# Patient Record
Sex: FEMALE | Race: WHITE | NOT HISPANIC OR LATINO | Employment: FULL TIME | URBAN - METROPOLITAN AREA
[De-identification: names, ages, dates, MRNs, and addresses within clinical notes are randomized per-mention and may not be internally consistent; named-entity substitution may affect disease eponyms.]

---

## 2018-02-15 ENCOUNTER — TRANSCRIBE ORDERS (OUTPATIENT)
Dept: PHYSICAL THERAPY | Facility: CLINIC | Age: 38
End: 2018-02-15

## 2018-02-15 DIAGNOSIS — S93.601A SPRAIN OF RIGHT FOOT, INITIAL ENCOUNTER: Primary | ICD-10-CM

## 2018-02-16 ENCOUNTER — TREATMENT (OUTPATIENT)
Dept: PHYSICAL THERAPY | Facility: CLINIC | Age: 38
End: 2018-02-16

## 2018-02-16 DIAGNOSIS — S93.601D RIGHT FOOT SPRAIN, SUBSEQUENT ENCOUNTER: ICD-10-CM

## 2018-02-16 DIAGNOSIS — M79.671 RIGHT FOOT PAIN: Primary | ICD-10-CM

## 2018-02-16 PROCEDURE — 97014 ELECTRIC STIMULATION THERAPY: CPT | Performed by: PHYSICAL THERAPIST

## 2018-02-16 PROCEDURE — 97110 THERAPEUTIC EXERCISES: CPT | Performed by: PHYSICAL THERAPIST

## 2018-02-16 PROCEDURE — 97530 THERAPEUTIC ACTIVITIES: CPT | Performed by: PHYSICAL THERAPIST

## 2018-02-16 PROCEDURE — 97001 PR PHYS THERAPY EVALUATION: CPT | Performed by: PHYSICAL THERAPIST

## 2018-02-16 NOTE — PROGRESS NOTES
Physical Therapy Initial Evaluation and Plan of Care    TIME IN 1105 TIME OUT 1158  Patient: Margaret Perez   : 1980  Diagnosis/ICD-10 Code:  Right foot pain [M79.671]  Referring practitioner: Dharmesh Valencia MD    Subjective Evaluation    History of Present Illness  Date of onset: 2018  Mechanism of injury: Pt was kicking a soccer ball with a young client and and client stepped on patient's foot when trying to steal a ball.  Pt fell to the ground and heard a pop.  Pt came into PT clinic, ice applied and swelling and bruising appeared shortly after.  MD dx of Grade II ligament strain affecting 4th and 5th metatarsal.        Patient Occupation:  at [employer] and working full duty without running Pain  Current pain rating: 3  At best pain ratin  At worst pain ratin  Location: lateral right foot; Right ITB and SIJ discomfort  Quality: dull ache and sharp  Aggravating factors: ambulation and stairs (attempt to run)    Diagnostic Tests  X-ray: normal    Treatments  No previous or current treatments  Patient Goals  Patient goals for therapy: decreased pain, return to sport/leisure activities and return to work             Objective       Tenderness     Right Ankle/Foot   Tenderness in the fifth metatarsal base. No tenderness in the anterior talofibular ligament and lateral malleolus.     Active Range of Motion     Right Ankle/Foot   Dorsiflexion (ke): 15 degrees   Plantar flexion: 75 degrees   Inversion: 30 degrees   Eversion: 27 degrees     Joint Play     Right Ankle/Foot  Joints within functional limits are the talocrural joint, subtalar joint, midfoot and forefoot.     Strength/Myotome Testing     Right Hip   Planes of Motion   Extension: 5  Abduction: 4    Isolated Muscles   Gluteus maximums: 4    Right Ankle/Foot   Dorsiflexion: 5  Inversion: 5  Eversion: 4- (increased pain)    Tests     Lumbar     Right   Negative passive SLR.     Additional Tests  Details  Supine leg length (+) Left longer    Ambulation     Observational Gait   Decreased walking speed, right stance time and left step length.          Assessment & Plan     Assessment  Impairments: abnormal gait, impaired balance, impaired physical strength, lacks appropriate home exercise program and pain with function  Assessment details: Pt is active 37 y.o. Female who presents to physical therapy s/p right foot sprain following a [employer] client stepping on her foot.  Pt also presents with possible left anterior innominate rotation likely associated with altered gait mechanics.  Pt requires skilled physical therapy to address impairments and return to prior level of function including walking, stair navigation, running and weight lifting.    Prognosis: good  Functional Limitations: walking, uncomfortable because of pain, standing and stooping  Goals  Plan Goals: Short-term Goals - In 2 weeks:  1. Pt will be (I) with initial HEP.  2. Pt will perform SLS on right LE 30 seconds without loss of balance for improved ability to ambulate and navigate stairs without pain.  3. Pt will demonstrate ability to maintain neutral pelvis inter-session with (-) supine leg length and LISHA adduction drop test.    Long-term Goals - In 4 weeks:  1. Right hip flexion, abduction, extension strength improve to 5/5.  2. Pt will perform 6 inch step down WB on Right without pain, hip adduction or IR or contralateral pelvic drop.  3. Pt will ambulate with equal stance time, step length and adequate floor clearance.  4. LEFS score improve to 80/80 to demonstrate functional improvement.   5. Pt will participate in low level bilateral hops without increased pain or difficulty.    Plan  Therapy options: will be seen for skilled physical therapy services  Planned modality interventions: cryotherapy, electrical stimulation/Russian stimulation and thermotherapy (hydrocollator packs)  Planned therapy interventions: abdominal trunk  stabilization, balance/weight-bearing training, body mechanics training, functional ROM exercises, gait training, home exercise program, manual therapy, neuromuscular re-education, soft tissue mobilization, strengthening, stretching and therapeutic activities  Frequency: 3x week  Duration in weeks: 4  Treatment plan discussed with: patient        Manual Therapy:    10     mins  75362;  Therapeutic Exercise:    10     mins  15709;     Neuromuscular Clyde:    -    mins  81957;    Therapeutic Activity:     10     mins  86023;     Gait Training:      -     mins  37241;     Ultrasound:     -     mins  49938;    Electrical Stimulation:    15     mins  04301 ( );  Dry Needling     -     mins self-pay    Timed Treatment:   30   mins   Total Treatment:     53   mins    PT SIGNATURE: Marce Benavidez, PT, DPT   DATE TREATMENT INITIATED: 2/16/2018    Initial Certification  Certification Period: 5/17/2018  I certify that the therapy services are furnished while this patient is under my care.  The services outlined above are required by this patient, and will be reviewed every 90 days.     PHYSICIAN: Dharmesh Valencia MD      DATE:     Please sign and return via fax to [phone]. Thank you, [facility] Physical Therapy.

## 2018-02-16 NOTE — PATIENT INSTRUCTIONS
Purpose of treatment  Pathology and involved anatomy  Wear shorts to next session for initiation of soft tissue massage for LE  HEP performance  Please view [facility] Margaret Perez for a complete list of HEP instructions.

## 2018-02-19 ENCOUNTER — TREATMENT (OUTPATIENT)
Dept: PHYSICAL THERAPY | Facility: CLINIC | Age: 38
End: 2018-02-19

## 2018-02-19 DIAGNOSIS — M79.671 RIGHT FOOT PAIN: Primary | ICD-10-CM

## 2018-02-19 DIAGNOSIS — S93.601D RIGHT FOOT SPRAIN, SUBSEQUENT ENCOUNTER: ICD-10-CM

## 2018-02-19 PROCEDURE — 97110 THERAPEUTIC EXERCISES: CPT | Performed by: PHYSICAL THERAPIST

## 2018-02-19 PROCEDURE — 97530 THERAPEUTIC ACTIVITIES: CPT | Performed by: PHYSICAL THERAPIST

## 2018-02-19 PROCEDURE — 97014 ELECTRIC STIMULATION THERAPY: CPT | Performed by: PHYSICAL THERAPIST

## 2018-02-19 PROCEDURE — 97140 MANUAL THERAPY 1/> REGIONS: CPT | Performed by: PHYSICAL THERAPIST

## 2018-02-19 NOTE — PROGRESS NOTES
"Physical Therapy Daily Progress Note    Time In 1501  Time Out 1605    Margaret Perez reports: \"My foot is feeling better than it was.  I'm forcing myself to use it, walk more normally, and alternate feet going up and down steps.  It still hurts along the fourth and fifth metatarsals, especially on the outside, but it's getting better.  It felt good being taped.\"    Subjective     Objective   See Exercise, Manual, and Modality Logs for complete treatment.       Assessment & Plan     Assessment  Assessment details: Patient verbalizes gradual subjective improvements in (R) foot s/s and functional ability as well as positive response to kinesiotaping techniques.  She ambulates with mildly decreased (R) weightshift, stance time, and push-off (R) LE but demonstrates ability to negotiate steps reciprocally with mild compensation. Single limb stance activities are somewhat compensated (R) LE versus (L) LE and tenderness persists primarily along superior and lateral aspects of fifth metatarsal.        Progress per Plan of Care         Manual Therapy:    9     mins  34364;  Therapeutic Exercise:    21     mins  77555;     Neuromuscular Clyde:        mins  14200;    Therapeutic Activity:     16     mins  32578;     Gait Training:           mins  71436;     Ultrasound:          mins  98619;    Electrical Stimulation:    15     mins  43691 ( );  Dry Needling          mins self-pay    Timed Treatment:   46   mins   Total Treatment:     64   mins    Clover Brand PT, DPT  Physical Therapist  [place] License # [id number]    "

## 2018-02-20 ENCOUNTER — TRANSCRIBE ORDERS (OUTPATIENT)
Dept: PHYSICAL THERAPY | Facility: CLINIC | Age: 38
End: 2018-02-20

## 2018-02-20 ENCOUNTER — TREATMENT (OUTPATIENT)
Dept: PHYSICAL THERAPY | Facility: CLINIC | Age: 38
End: 2018-02-20

## 2018-02-20 DIAGNOSIS — S93.601D RIGHT FOOT SPRAIN, SUBSEQUENT ENCOUNTER: ICD-10-CM

## 2018-02-20 DIAGNOSIS — M79.671 RIGHT FOOT PAIN: Primary | ICD-10-CM

## 2018-02-20 PROCEDURE — 97140 MANUAL THERAPY 1/> REGIONS: CPT | Performed by: PHYSICAL THERAPIST

## 2018-02-20 PROCEDURE — 97035 APP MDLTY 1+ULTRASOUND EA 15: CPT | Performed by: PHYSICAL THERAPIST

## 2018-02-20 NOTE — PROGRESS NOTES
Physical Therapy Daily Progress Note    Time In 1357  Time Out 1425    Margaret Perez reports: doing well and no pain in foot pre-treatment. Pt reports most pain when sitting for a long time and having to get up.  Pt reports compliance with HEP.  Pt reports MD for ultrasound instead of electrical stimulation.  Pt reports MD concern for stress fracture, but no follow up scheduled, unless pt is still experiencing pain on Monday.    Subjective     Objective   See Exercise, Manual, and Modality Logs for complete treatment.       Assessment & Plan     Assessment  Assessment details: Pt demonstrates good understanding and compliance with HEP.  Electrical stimulation deferred today due to no pain pre-treatment or post-treatment.  Initiated ultrasound per MD order today.  Pt will continue to progress towards goals with continued skilled PT and regular HEP performance.        Progress strengthening /stabilization /functional activity           Manual Therapy:    15     mins  25689;  Therapeutic Exercise:    -     mins  57724;     Neuromuscular Clyde:    -    mins  46906;    Therapeutic Activity:     -     mins  60402;     Gait Training:      -     mins  82562;     Ultrasound:     8     mins  69641;    Electrical Stimulation:    -     mins  82424 ( );  Dry Needling     -     mins self-pay    Timed Treatment:   15   mins   Total Treatment:     28   mins    Marce Benavidez, PT, DPT  Physical Therapist

## 2018-02-22 ENCOUNTER — TREATMENT (OUTPATIENT)
Dept: PHYSICAL THERAPY | Facility: CLINIC | Age: 38
End: 2018-02-22

## 2018-02-22 DIAGNOSIS — M79.671 RIGHT FOOT PAIN: Primary | ICD-10-CM

## 2018-02-22 DIAGNOSIS — S93.601D RIGHT FOOT SPRAIN, SUBSEQUENT ENCOUNTER: ICD-10-CM

## 2018-02-22 PROCEDURE — 97140 MANUAL THERAPY 1/> REGIONS: CPT | Performed by: PHYSICAL THERAPIST

## 2018-02-22 PROCEDURE — 97014 ELECTRIC STIMULATION THERAPY: CPT | Performed by: PHYSICAL THERAPIST

## 2018-02-22 NOTE — PROGRESS NOTES
Physical Therapy Daily Progress Note    Time In 1105  Time Out 1135    Margaret Perez reports: increased soreness from standing a lot yesterday for work.    Subjective     Objective       Tenderness     Right Ankle/Foot   No tenderness in the fifth metatarsal base.     Additional Tenderness Details  Right shaft of 4th and 5th metatarsals (-) TTP    Joint Play     Right Ankle/Foot  Joints within functional limits are the forefoot.     Additional Joint Play Details  Right 4th and 5th metatarsal A-P and P-A hypermobile     See Exercise, Manual, and Modality Logs for complete treatment.       Assessment & Plan     Assessment  Assessment details: Pt demonstrates improved symptoms and (-) TTP at base of 5th metatarsal or shaft of 4th and 5th metatarsals.  Treatment session focused on decreasing pain and improving mobility.  Pt continues to demonstrate excellent progress towards goals and excellent compliance with HEP.  Ultrasound deferred due to pt request.        Progress per Plan of Care and towards d/c to (I) with HEP over next week.           Manual Therapy:    15     mins  42056;  Therapeutic Exercise:    -     mins  79823;     Neuromuscular Clyde:    -    mins  92654;    Therapeutic Activity:     -     mins  03484;     Gait Training:      -     mins  61343;     Ultrasound:     -     mins  60702;    Electrical Stimulation:    15     mins  81678 ( );  Dry Needling     -     mins self-pay    Timed Treatment:   15   mins   Total Treatment:     30   mins    Marce Benavidez, PT, DPT  Physical Therapist

## 2018-02-26 ENCOUNTER — TREATMENT (OUTPATIENT)
Dept: PHYSICAL THERAPY | Facility: CLINIC | Age: 38
End: 2018-02-26

## 2018-02-26 DIAGNOSIS — S93.601D RIGHT FOOT SPRAIN, SUBSEQUENT ENCOUNTER: ICD-10-CM

## 2018-02-26 DIAGNOSIS — M79.671 RIGHT FOOT PAIN: Primary | ICD-10-CM

## 2018-02-26 PROCEDURE — 97014 ELECTRIC STIMULATION THERAPY: CPT | Performed by: PHYSICAL THERAPIST

## 2018-02-26 PROCEDURE — 97140 MANUAL THERAPY 1/> REGIONS: CPT | Performed by: PHYSICAL THERAPIST

## 2018-02-26 NOTE — PROGRESS NOTES
Physical Therapy Daily Progress Note    Time In 1104  Time Out 1133    Margaret Perez reports: foot is sore, but eager to attempt to run today.  Pt reports push-off increases pain..     Subjective     Objective   See Exercise, Manual, and Modality Logs for complete treatment.       Assessment & Plan     Assessment  Assessment details: Omitted Kinesiotape webbing due to improvement in swelling and reported improved symptoms from the support of Kinesiotape.  Pt performed bilateral and single-leg hops with right shoe on.  Pt demonstrated adequate floor clearance and no pain.  Pt educated on jogging on predictable surface for trial and limiting duration to avoid increased stress and pain.  Pt is progressing well and performs HEP independently at this time.      Plan  Plan details: Progress to (I) with HEP pending tolerance to higher level activity including jogging          Progress per Plan of Care   Assess tolerance to jogging           Manual Therapy:    9     mins  41155;  Therapeutic Exercise:    5     mins  48205;     Neuromuscular Clyde:    -    mins  56809;    Therapeutic Activity:     -     mins  31177;     Gait Training:      -     mins  87917;     Ultrasound:     -     mins  89594;    Electrical Stimulation:    15     mins  13991 ( );  Dry Needling     -     mins self-pay    Timed Treatment:   14   mins   Total Treatment:     29   mins    Marce Benavidez, PT, DPT  Physical Therapist

## 2018-02-28 ENCOUNTER — TREATMENT (OUTPATIENT)
Dept: PHYSICAL THERAPY | Facility: CLINIC | Age: 38
End: 2018-02-28

## 2018-02-28 DIAGNOSIS — S93.601D RIGHT FOOT SPRAIN, SUBSEQUENT ENCOUNTER: ICD-10-CM

## 2018-02-28 DIAGNOSIS — M79.671 RIGHT FOOT PAIN: Primary | ICD-10-CM

## 2018-02-28 PROCEDURE — 97140 MANUAL THERAPY 1/> REGIONS: CPT | Performed by: PHYSICAL THERAPIST

## 2018-02-28 PROCEDURE — 97014 ELECTRIC STIMULATION THERAPY: CPT | Performed by: PHYSICAL THERAPIST

## 2018-02-28 NOTE — PROGRESS NOTES
Physical Therapy Daily Progress Note    Time In 1306  Time Out 1339    Margaret Perez reports: doing well, hasn't attempted jogging yet.  Pt reports stiffness and soreness in foot first thing in AM.    Subjective     Objective       Joint Play     Additional Joint Play Details  Right 5th metatarsal A<-> P mobilization hypermobile and pain-free       See Exercise, Manual, and Modality Logs for complete treatment.       Assessment & Plan     Assessment  Assessment details: Pt educated on importance of towel curls for strengthening intrinsics of foot and for continued single-leg activities to improve ankle strength and stability due to hypermobility of lateral foot.  Kinesiotape continued due to decreased pain with application.          Progress per Plan of Care           Manual Therapy:    10     mins  63187;  Therapeutic Exercise:    -     mins  43156;     Neuromuscular Clyde:    -    mins  36745;    Therapeutic Activity:     -     mins  71516;     Gait Training:      -     mins  93045;     Ultrasound:     -     mins  15906;    Electrical Stimulation:    15     mins  83794 ( );  Dry Needling     -     mins self-pay  Self care home mangmt 5 mins 46425    Timed Treatment:   15   mins   Total Treatment:     33   mins    Marce Benavidez, PT, DPT  Physical Therapist

## 2018-03-02 ENCOUNTER — TREATMENT (OUTPATIENT)
Dept: PHYSICAL THERAPY | Facility: CLINIC | Age: 38
End: 2018-03-02

## 2018-03-02 DIAGNOSIS — S93.601D RIGHT FOOT SPRAIN, SUBSEQUENT ENCOUNTER: ICD-10-CM

## 2018-03-02 DIAGNOSIS — M79.671 RIGHT FOOT PAIN: Primary | ICD-10-CM

## 2018-03-02 PROCEDURE — 97110 THERAPEUTIC EXERCISES: CPT | Performed by: PHYSICAL THERAPIST

## 2018-03-02 PROCEDURE — 97014 ELECTRIC STIMULATION THERAPY: CPT | Performed by: PHYSICAL THERAPIST

## 2018-03-02 NOTE — PROGRESS NOTES
"Physical Therapy Daily Progress Note    Time In 1131  Time Out 1157    Margaret Perez reports: hasn't run yet.  Right ankle is now sore and bone on foot doesn't feel as bad.      Subjective     Objective       Ambulation     Observational Gait   Left stance time, right stance time, left step length and right step length within functional limits.     Functional Assessment     Forward Step Down 6\"     Right Leg  Valgus. No pain and negative Trendelenburg.      See Exercise, Manual, and Modality Logs for complete treatment.       Assessment & Plan     Assessment  Assessment details: Pt demonstrates improved gait mechanics and tolerance to weight bearing.  Pt continues to demonstrate decreased neuromuscular control on Right LE.  Pt control and mechanics with step down improved with verbal cueing and attention to task.  Pt has not yet returned to prior level of function.  Pt is progressing well towards goals and continues to require skilled physical therapy to progress strength and function to prior status including running.        Progress per Plan of Care           Manual Therapy:    5     mins  05374;  Therapeutic Exercise:    8     mins  90525;     Neuromuscular Clyde:    -    mins  09924;    Therapeutic Activity:     -     mins  01808;     Gait Training:      -     mins  89989;     Ultrasound:     -     mins  31983;    Electrical Stimulation:    12     mins  29171 ( );  Dry Needling     -     mins self-pay    Timed Treatment:   13   mins   Total Treatment:     26   mins    Marce Benavidez, PT, DPT  Physical Therapist    "

## 2018-03-05 ENCOUNTER — TREATMENT (OUTPATIENT)
Dept: PHYSICAL THERAPY | Facility: CLINIC | Age: 38
End: 2018-03-05

## 2018-03-05 DIAGNOSIS — M79.671 RIGHT FOOT PAIN: Primary | ICD-10-CM

## 2018-03-05 DIAGNOSIS — S93.601D RIGHT FOOT SPRAIN, SUBSEQUENT ENCOUNTER: ICD-10-CM

## 2018-03-05 PROCEDURE — 97014 ELECTRIC STIMULATION THERAPY: CPT | Performed by: PHYSICAL THERAPIST

## 2018-03-05 PROCEDURE — 97140 MANUAL THERAPY 1/> REGIONS: CPT | Performed by: PHYSICAL THERAPIST

## 2018-03-05 NOTE — PROGRESS NOTES
Physical Therapy Daily Progress Note    Time In 1053  Time Out 1130    Margaret Perez reports: some soreness in whole body and also in right foot after jogging on turf yesterday.  Pt reports some swelling in foot and turns were the hardest.     Subjective     Objective   See Exercise, Manual, and Modality Logs for complete treatment.       Assessment & Plan     Assessment  Assessment details: Pt presented with mild increased swelling after jogging on the turf yesterday.  Pt encouraged on regular towel curls for strengthening intrinsics and medial arch.  Pt educated on gradual progression of activity, tissue healing time and to take turns wide.  Pt educated on less stress of straight-plane running and to avoid cutting, planning and pivoting on Right LE.  Re-initiated webbing kinesiotape to improve circulation and decrease swelling.         Progress per Plan of Care           Manual Therapy:    15     mins  28016;  Therapeutic Exercise:    -     mins  49302;     Neuromuscular Clyde:    -    mins  54363;    Therapeutic Activity:     -     mins  09718;     Gait Training:      -     mins  92762;     Ultrasound:     -     mins  44564;    Electrical Stimulation:    15     mins  32526 ( );  Dry Needling     -     mins self-pay    Timed Treatment:   15   mins   Total Treatment:     37   mins    Marce Benavidez, PT, DPT  Physical Therapist

## 2018-03-07 ENCOUNTER — TREATMENT (OUTPATIENT)
Dept: PHYSICAL THERAPY | Facility: CLINIC | Age: 38
End: 2018-03-07

## 2018-03-07 DIAGNOSIS — M79.671 RIGHT FOOT PAIN: Primary | ICD-10-CM

## 2018-03-07 DIAGNOSIS — S93.601D RIGHT FOOT SPRAIN, SUBSEQUENT ENCOUNTER: ICD-10-CM

## 2018-03-07 PROCEDURE — 97140 MANUAL THERAPY 1/> REGIONS: CPT | Performed by: PHYSICAL THERAPIST

## 2018-03-07 PROCEDURE — 97014 ELECTRIC STIMULATION THERAPY: CPT | Performed by: PHYSICAL THERAPIST

## 2018-03-07 NOTE — PROGRESS NOTES
Physical Therapy Daily Progress Note    Time In 1105  Time Out 1120  Pt returned at 1445 for modalities   And time out 1510    Margaret Perez reports: improvement in pain with Kinesiotape on.  Pt reports plan to workout today but has not run again.     Subjective     Objective   See Exercise, Manual, and Modality Logs for complete treatment.       Assessment & Plan     Assessment  Assessment details: Pt demonstrates improvement in lateral foot swelling but has not attempted to run again since prior to last session.  Pt is progressing well under current treatment plan and continued PT will be pending response to return to exercise activity and running due to pt occupation requiring occasional running and exercise demonstration.      Progress per Plan of Care           Manual Therapy:    15     mins  87665;  Therapeutic Exercise:    -     mins  59978;     Neuromuscular Clyde:    -    mins  41051;    Therapeutic Activity:     -     mins  58019;     Gait Training:      -     mins  63770;     Ultrasound:     -     mins  81883;    Electrical Stimulation:    15     mins  16461 ( );  Dry Needling     -     mins self-pay    Timed Treatment:   15   mins   Total Treatment:     40   mins    Marce Benavidez, PT, DPT  Physical Therapist

## 2018-03-09 ENCOUNTER — TREATMENT (OUTPATIENT)
Dept: PHYSICAL THERAPY | Facility: CLINIC | Age: 38
End: 2018-03-09

## 2018-03-09 DIAGNOSIS — S93.601D RIGHT FOOT SPRAIN, SUBSEQUENT ENCOUNTER: ICD-10-CM

## 2018-03-09 DIAGNOSIS — M79.671 RIGHT FOOT PAIN: Primary | ICD-10-CM

## 2018-03-09 PROCEDURE — 97014 ELECTRIC STIMULATION THERAPY: CPT | Performed by: PHYSICAL THERAPIST

## 2018-03-09 PROCEDURE — 97140 MANUAL THERAPY 1/> REGIONS: CPT | Performed by: PHYSICAL THERAPIST

## 2018-03-10 NOTE — PROGRESS NOTES
Physical Therapy Daily Progress Note    Time In 1030  Time Out 1100    Margaret Perez reports: foot has been sore lately.    Subjective     Objective   See Exercise, Manual, and Modality Logs for complete treatment.       Assessment & Plan     Assessment  Assessment details: Pt soreness is related to degree of sprain and pt work responsibilities requiring extended time standing and walking.  Pt continues to respond well to Kinesiotape and modalities to decrease pain.          Progress per Plan of Care  - progress to more active strategies and to d/c to (I) with HEP.         Manual Therapy:    10     mins  06407;  Therapeutic Exercise:    -     mins  84792;     Neuromuscular Clyde:    -    mins  18394;    Therapeutic Activity:     -     mins  83346;     Gait Training:      -     mins  30196;     Ultrasound:     -     mins  94733;    Electrical Stimulation:    15     mins  93505 ( );  Dry Needling     -     mins self-pay    Timed Treatment:   10   mins   Total Treatment:     30   mins    Marce Benavidez, PT, DPT  Physical Therapist

## 2024-10-03 ENCOUNTER — HOSPITAL ENCOUNTER (EMERGENCY)
Facility: HOSPITAL | Age: 44
Discharge: HOME | End: 2024-10-03
Attending: STUDENT IN AN ORGANIZED HEALTH CARE EDUCATION/TRAINING PROGRAM
Payer: COMMERCIAL

## 2024-10-03 VITALS
SYSTOLIC BLOOD PRESSURE: 123 MMHG | BODY MASS INDEX: 21.34 KG/M2 | OXYGEN SATURATION: 98 % | HEART RATE: 54 BPM | DIASTOLIC BLOOD PRESSURE: 85 MMHG | HEIGHT: 64 IN | RESPIRATION RATE: 16 BRPM | TEMPERATURE: 98 F | WEIGHT: 125 LBS

## 2024-10-03 DIAGNOSIS — H81.11 BENIGN PAROXYSMAL POSITIONAL VERTIGO OF RIGHT EAR: Primary | ICD-10-CM

## 2024-10-03 LAB
ALBUMIN SERPL-MCNC: 4.5 G/DL (ref 3.5–5.2)
ALBUMIN/GLOB SERPL: 1.7 G/DL
ALP SERPL-CCNC: 72 U/L (ref 39–117)
ALT SERPL W P-5'-P-CCNC: 12 U/L (ref 1–33)
ANION GAP SERPL CALCULATED.3IONS-SCNC: 7 MMOL/L (ref 5–15)
AST SERPL-CCNC: 16 U/L (ref 1–32)
BASOPHILS # BLD AUTO: 0.02 10*3/MM3 (ref 0–0.2)
BASOPHILS NFR BLD AUTO: 0.5 % (ref 0–1.5)
BILIRUB SERPL-MCNC: 0.4 MG/DL (ref 0–1.2)
BUN SERPL-MCNC: 12 MG/DL (ref 6–20)
BUN/CREAT SERPL: 15.6 (ref 7–25)
CALCIUM SPEC-SCNC: 10.1 MG/DL (ref 8.6–10.5)
CHLORIDE SERPL-SCNC: 103 MMOL/L (ref 98–107)
CO2 SERPL-SCNC: 29 MMOL/L (ref 22–29)
CREAT SERPL-MCNC: 0.77 MG/DL (ref 0.57–1)
DEPRECATED RDW RBC AUTO: 36.8 FL (ref 37–54)
EGFRCR SERPLBLD CKD-EPI 2021: 96.5 ML/MIN/1.73
EOSINOPHIL # BLD AUTO: 0.06 10*3/MM3 (ref 0–0.4)
EOSINOPHIL NFR BLD AUTO: 1.5 % (ref 0.3–6.2)
ERYTHROCYTE [DISTWIDTH] IN BLOOD BY AUTOMATED COUNT: 11.4 % (ref 12.3–15.4)
GLOBULIN UR ELPH-MCNC: 2.7 GM/DL
GLUCOSE SERPL-MCNC: 124 MG/DL (ref 65–99)
HCT VFR BLD AUTO: 37.1 % (ref 34–46.6)
HGB BLD-MCNC: 13.1 G/DL (ref 12–15.9)
IMM GRANULOCYTES # BLD AUTO: 0 10*3/MM3 (ref 0–0.05)
IMM GRANULOCYTES NFR BLD AUTO: 0 % (ref 0–0.5)
LYMPHOCYTES # BLD AUTO: 1.93 10*3/MM3 (ref 0.7–3.1)
LYMPHOCYTES NFR BLD AUTO: 47.4 % (ref 19.6–45.3)
MCH RBC QN AUTO: 31.2 PG (ref 26.6–33)
MCHC RBC AUTO-ENTMCNC: 35.3 G/DL (ref 31.5–35.7)
MCV RBC AUTO: 88.3 FL (ref 79–97)
MONOCYTES # BLD AUTO: 0.59 10*3/MM3 (ref 0.1–0.9)
MONOCYTES NFR BLD AUTO: 14.5 % (ref 5–12)
NEUTROPHILS NFR BLD AUTO: 1.47 10*3/MM3 (ref 1.7–7)
NEUTROPHILS NFR BLD AUTO: 36.1 % (ref 42.7–76)
PLATELET # BLD AUTO: 197 10*3/MM3 (ref 140–450)
PMV BLD AUTO: 10.4 FL (ref 6–12)
POTASSIUM SERPL-SCNC: 3.8 MMOL/L (ref 3.5–5.2)
PROT SERPL-MCNC: 7.2 G/DL (ref 6–8.5)
RBC # BLD AUTO: 4.2 10*6/MM3 (ref 3.77–5.28)
SODIUM SERPL-SCNC: 139 MMOL/L (ref 136–145)
WBC NRBC COR # BLD AUTO: 4.07 10*3/MM3 (ref 3.4–10.8)

## 2024-10-03 PROCEDURE — 99283 EMERGENCY DEPT VISIT LOW MDM: CPT

## 2024-10-03 PROCEDURE — 85025 COMPLETE CBC W/AUTO DIFF WBC: CPT

## 2024-10-03 PROCEDURE — 63710000001 ONDANSETRON ODT 4 MG TABLET DISPERSIBLE

## 2024-10-03 PROCEDURE — 80053 COMPREHEN METABOLIC PANEL: CPT

## 2024-10-03 RX ORDER — MECLIZINE HYDROCHLORIDE 25 MG/1
25 TABLET ORAL 4 TIMES DAILY PRN
Qty: 20 TABLET | Refills: 0 | Status: SHIPPED | OUTPATIENT
Start: 2024-10-03

## 2024-10-03 RX ORDER — SODIUM CHLORIDE 0.9 % (FLUSH) 0.9 %
10 SYRINGE (ML) INJECTION AS NEEDED
Status: DISCONTINUED | OUTPATIENT
Start: 2024-10-03 | End: 2024-10-03 | Stop reason: HOSPADM

## 2024-10-03 RX ORDER — FLUTICASONE PROPIONATE 50 UG/1
2 SPRAY, METERED NASAL DAILY
Qty: 9.9 ML | Refills: 0 | Status: SHIPPED | OUTPATIENT
Start: 2024-10-03

## 2024-10-03 RX ORDER — ONDANSETRON 4 MG/1
8 TABLET, ORALLY DISINTEGRATING ORAL EVERY 8 HOURS PRN
Qty: 12 TABLET | Refills: 0 | Status: SHIPPED | OUTPATIENT
Start: 2024-10-03

## 2024-10-03 RX ORDER — ONDANSETRON 4 MG/1
4 TABLET, ORALLY DISINTEGRATING ORAL ONCE
Status: COMPLETED | OUTPATIENT
Start: 2024-10-03 | End: 2024-10-03

## 2024-10-03 RX ORDER — CETIRIZINE HYDROCHLORIDE 10 MG/1
10 TABLET ORAL DAILY
Qty: 30 TABLET | Refills: 0 | Status: SHIPPED | OUTPATIENT
Start: 2024-10-03

## 2024-10-03 RX ORDER — MECLIZINE HYDROCHLORIDE 25 MG/1
25 TABLET ORAL ONCE
Status: COMPLETED | OUTPATIENT
Start: 2024-10-03 | End: 2024-10-03

## 2024-10-03 RX ADMIN — MECLIZINE HYDROCHLORIDE 25 MG: 25 TABLET ORAL at 19:47

## 2024-10-03 RX ADMIN — ONDANSETRON 4 MG: 4 TABLET, ORALLY DISINTEGRATING ORAL at 20:37

## 2024-10-03 NOTE — FSED PROVIDER NOTE
Subjective   History of Present Illness  Patient is a 44-year-old female with history of cancer who presents to the emergency department for dizziness on and off over the past 5 days.  Initially onset as she was making her way to the grocery.  Does not describe a lightheadedness or room spinning necessarily, but reports it feels like her head was moving slowing. Associated nausea.  Has had some much more mild episodes over the past week.  Has had 1 episode of vomiting.  Then again today had a more significant episode.  Patient was seen by a chiropractor this week which may have helped.  Reports her dizziness was elicited with side-to-side eye movements at that time.  States she has some right sided TMJ and he performed a maneuver that caused some postnasal drainage.  Patient is a .  Has gotten tinnitus on and off of the right ear for years.  No changes this week.        Review of Systems   Constitutional:  Negative for appetite change, chills, diaphoresis, fatigue and fever.   HENT:  Positive for postnasal drip and tinnitus (chronic). Negative for congestion, ear discharge, ear pain, rhinorrhea, sinus pressure, sinus pain, sore throat, trouble swallowing and voice change.         Ear fullness   Eyes:  Negative for photophobia, pain and visual disturbance.   Respiratory:  Negative for shortness of breath.    Cardiovascular:  Negative for chest pain.   Gastrointestinal:  Positive for nausea and vomiting. Negative for abdominal pain.   Skin:  Negative for color change, pallor, rash and wound.   Neurological:  Positive for dizziness. Negative for tremors, seizures, syncope, speech difficulty, weakness, numbness and headaches.       History reviewed. No pertinent past medical history.    Allergies   Allergen Reactions    Adhesive Tape Other (See Comments)     Surgical glue causes blisters    Reaction: Rash       No past surgical history on file.    History reviewed. No pertinent family history.    Social  History     Socioeconomic History    Marital status: Single           Objective   Physical Exam  Constitutional:       General: She is not in acute distress.     Appearance: Normal appearance. She is normal weight. She is not ill-appearing, toxic-appearing or diaphoretic.   Eyes:      Extraocular Movements: Extraocular movements intact.      Right eye: No nystagmus.      Left eye: No nystagmus.      Conjunctiva/sclera: Conjunctivae normal.      Pupils: Pupils are equal, round, and reactive to light.      Comments: Unremarkable chula hallpike.    Cardiovascular:      Rate and Rhythm: Normal rate and regular rhythm.   Pulmonary:      Effort: Pulmonary effort is normal. No respiratory distress.   Neurological:      Mental Status: She is alert.         Procedures           ED Course  ED Course as of 10/03/24 2039   Thu Oct 03, 2024   2020 CBC and CMP largely unremarkable.  She does have a mildly low absolute neutrophil count, which has been present on previous lab work over the past couple of years as well.  Lamine this with the patient.  Recommend follow-up with primary care. [AS]      ED Course User Index  [AS] Amelia Mitchell, KEO                                           Medical Decision Making  Patient is a 44-year-old female who presents to the emergency department for dizziness on and off over the past week.  Describes 2 episodes of more intense dizziness with nausea and vomiting.  She overall appears well, no acute distress, nontoxic.  Vital signs are WNL.  Exam is unremarkable.  Patient does describe some ear fullness, drainage.  Has had the dizziness elicited with certain eye movements.  No concerning associated symptoms or focal deficit.  Much more likely BPPV.  Patient's risk factors for other etiologies is minimal.  She does improve with meclizine while here.  Just continues to feel nauseous.  Discussed symptomatic measures.  Meclizine, Flonase, Zyrtec, Zofran prescribed.  Discussed when to return to the  emergency department.  Answered all questions.  Patient verbalized understanding and was agreeable to plan and discharge.    My differential diagnosis for dizziness included but is not limited to:  Labyrinthitis, vertigo, concussion, intracranial hemorrhage, stroke, migraine headache, cardiac arrhythmias, acute MI, hypotension, hypertension, hypoxia, inner ear and middle ear infections, anemia, electrolyte abnormalities, dehydration, hyperventilation and anxiety attacks..       Amount and/or Complexity of Data Reviewed  Labs: ordered.    Risk  Prescription drug management.        Final diagnoses:   Benign paroxysmal positional vertigo of right ear       ED Disposition  ED Disposition       ED Disposition   Discharge    Condition   Stable    Comment   --               Provider, No Known  [place] [address]  [phone]    Schedule an appointment as soon as possible for a visit            Medication List        New Prescriptions      cetirizine 10 MG tablet  Commonly known as: zyrTEC  Take 1 tablet by mouth Daily.     fluticasone 50 MCG/ACT nasal spray  Commonly known as: FLONASE  Administer 2 sprays into the nostril(s) as directed by provider Daily.     meclizine 25 MG tablet  Commonly known as: ANTIVERT  Take 1 tablet by mouth 4 (Four) Times a Day As Needed for Dizziness or Nausea.     ondansetron ODT 4 MG disintegrating tablet  Commonly known as: ZOFRAN-ODT  Place 2 tablets on the tongue Every 8 (Eight) Hours As Needed for Nausea or Vomiting.               Where to Get Your Medications        These medications were sent to [facility] [address] [place] [id number] [facility] - [phone]  - [phone]   [address] [facility] [address]      Phone: [phone]   cetirizine 10 MG tablet  fluticasone 50 MCG/ACT nasal spray  meclizine 25 MG tablet  ondansetron ODT 4 MG disintegrating tablet

## 2024-10-04 NOTE — DISCHARGE INSTRUCTIONS
Use meclizine as prescribed as needed for vertigo.  Use Zofran as prescribed as needed for nausea and vomiting.  Overall, your symptoms are most consistent with BPPV.  You can try Epley maneuvers at home to realign the crystals in your ear.  You can also follow-up with ENT/audiology for adjustments.  Recommend Flonase and daily antihistamine such as cetirizine 10 mg at night.  Return to emergency department for worsening symptoms or other medical emergencies.  Recommended follow-up with PCP for repeat lab work.  Your neutrophils were a little low today, but overall your lab work was unremarkable.  Refer to the attached instructions for further information.